# Patient Record
Sex: MALE | URBAN - METROPOLITAN AREA
[De-identification: names, ages, dates, MRNs, and addresses within clinical notes are randomized per-mention and may not be internally consistent; named-entity substitution may affect disease eponyms.]

---

## 2017-11-04 ENCOUNTER — EMERGENCY (EMERGENCY)
Facility: HOSPITAL | Age: 57
LOS: 1 days | Discharge: HOME | End: 2017-11-04
Attending: EMERGENCY MEDICINE | Admitting: EMERGENCY MEDICINE
Payer: COMMERCIAL

## 2017-11-04 VITALS
TEMPERATURE: 98 F | SYSTOLIC BLOOD PRESSURE: 146 MMHG | OXYGEN SATURATION: 97 % | RESPIRATION RATE: 18 BRPM | HEART RATE: 88 BPM | WEIGHT: 149.91 LBS | DIASTOLIC BLOOD PRESSURE: 94 MMHG
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
## 2017-11-04 PROCEDURE — 99283 EMERGENCY DEPT VISIT LOW MDM: CPT | Mod: 25

## 2017-11-04 PROCEDURE — 99283 EMERGENCY DEPT VISIT LOW MDM: CPT

## 2017-11-04 RX ORDER — EPINEPHRINE 0.3 MG/.3ML
0.3 INJECTION INTRAMUSCULAR; SUBCUTANEOUS
Qty: 1 | Refills: 0 | OUTPATIENT
Start: 2017-11-04

## 2017-11-04 RX ORDER — DIPHENHYDRAMINE HCL 50 MG
25 CAPSULE ORAL ONCE
Qty: 0 | Refills: 0 | Status: COMPLETED | OUTPATIENT
Start: 2017-11-04 | End: 2017-11-04

## 2017-11-04 RX ADMIN — Medication 25 MILLIGRAM(S): at 08:44

## 2017-11-04 RX ADMIN — Medication 60 MILLIGRAM(S): at 08:44

## 2017-11-04 NOTE — ED PROVIDER NOTE - OBJECTIVE STATEMENT
allergic reaction  58 yo, reports waking up with feeling of lump in throat, perioral pruritis and blotchy rash on his chest and palms of hands with the sensation of swelling in his hands, some improvement since 25 mg of Benedryl, Patient reports this is the 3rd time this has happened this month, pt reports he had a blood panel for allergy testing and came back as somewhat allergic to shellfish and eggs. Patient has not had typical skin testing and did not see an allergist as of yet. had [employer] which has egg in it yesterday and [employer] for dinner.

## 2017-11-04 NOTE — ED PROVIDER NOTE - ENMT, MLM
Airway patent, Nasal mucosa clear. Mouth with normal mucosa. Throat has no vesicles, no oropharyngeal exudates and uvula is midline. NO stridor , no Uvular edema , nl speech

## 2017-11-04 NOTE — ED PROVIDER NOTE - MEDICAL DECISION MAKING DETAILS
Patient presents with symptoms consistent with allergy, suspect food related, Will Steroids, Benedryl 25 ( took 25 already,  Will  observe for improvement of symptoms. If symptoms resolve appropriately and pt well after observation will discharge on Prednisone, Benedryl In addition will advise allergist follow up and give epinephrine pen Rx with instructions for use. Patient presents with symptoms consistent with allergy, suspect food related, Will Steroids, Benadryl 25 ( took 25 already,  Will  observe for improvement of symptoms. If symptoms resolve appropriately and pt well after observation will discharge on Prednisone, Benedryl In addition will advise allergist follow up and give epinephrine pen Rx with instructions for use.

## 2017-11-04 NOTE — ED ADULT NURSE NOTE - OBJECTIVE STATEMENT
Patient arrived to ED via walk-in stating, "I think I am allergic to something I ate, I feel itchy."  Patient is AAOx3, in NAD, speaking in full sentences, complaining of "strange feeling" in throat and itchiness.  Patient denies SOB, N/V/D or any other complaints at this time.  Patient took 25mg Benadryl PTA.  Will continue with plan of care.

## 2017-11-09 DIAGNOSIS — Z91.013 ALLERGY TO SEAFOOD: ICD-10-CM

## 2017-11-09 DIAGNOSIS — Z91.018 ALLERGY TO OTHER FOODS: ICD-10-CM

## 2017-11-09 DIAGNOSIS — R21 RASH AND OTHER NONSPECIFIC SKIN ERUPTION: ICD-10-CM

## 2017-11-09 DIAGNOSIS — L29.9 PRURITUS, UNSPECIFIED: ICD-10-CM

## 2017-11-09 DIAGNOSIS — T78.1XXA OTHER ADVERSE FOOD REACTIONS, NOT ELSEWHERE CLASSIFIED, INITIAL ENCOUNTER: ICD-10-CM

## 2017-11-09 DIAGNOSIS — Z91.012 ALLERGY TO EGGS: ICD-10-CM

## 2017-11-09 DIAGNOSIS — M79.89 OTHER SPECIFIED SOFT TISSUE DISORDERS: ICD-10-CM

## 2017-12-02 ENCOUNTER — EMERGENCY (EMERGENCY)
Facility: HOSPITAL | Age: 57
LOS: 1 days | Discharge: HOME | End: 2017-12-02
Attending: EMERGENCY MEDICINE | Admitting: EMERGENCY MEDICINE
Payer: COMMERCIAL

## 2017-12-02 VITALS
SYSTOLIC BLOOD PRESSURE: 105 MMHG | DIASTOLIC BLOOD PRESSURE: 57 MMHG | TEMPERATURE: 98 F | HEART RATE: 80 BPM | OXYGEN SATURATION: 97 % | RESPIRATION RATE: 18 BRPM

## 2017-12-02 DIAGNOSIS — L29.9 PRURITUS, UNSPECIFIED: ICD-10-CM

## 2017-12-02 DIAGNOSIS — Z91.013 ALLERGY TO SEAFOOD: ICD-10-CM
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
## 2017-12-02 DIAGNOSIS — T78.40XA ALLERGY, UNSPECIFIED, INITIAL ENCOUNTER: ICD-10-CM

## 2017-12-02 DIAGNOSIS — Z79.52 LONG TERM (CURRENT) USE OF SYSTEMIC STEROIDS: ICD-10-CM

## 2017-12-02 DIAGNOSIS — Z91.018 ALLERGY TO OTHER FOODS: ICD-10-CM

## 2017-12-02 DIAGNOSIS — K14.9 DISEASE OF TONGUE, UNSPECIFIED: ICD-10-CM

## 2017-12-02 DIAGNOSIS — Z91.012 ALLERGY TO EGGS: ICD-10-CM

## 2017-12-02 DIAGNOSIS — J45.909 UNSPECIFIED ASTHMA, UNCOMPLICATED: ICD-10-CM
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
## 2017-12-02 DIAGNOSIS — Z79.899 OTHER LONG TERM (CURRENT) DRUG THERAPY: ICD-10-CM

## 2017-12-02 PROCEDURE — 99282 EMERGENCY DEPT VISIT SF MDM: CPT

## 2017-12-02 PROCEDURE — 99283 EMERGENCY DEPT VISIT LOW MDM: CPT | Mod: 25

## 2017-12-02 NOTE — ED PROVIDER NOTE - MEDICAL DECISION MAKING DETAILS
56 yo male with h/o anxiety, in the Er after an episode of itching to his body. Pt took PO Benadryl and his symptoms resolved. no rash on exam, no airway involvement or difficulty breathing. returned precautions discussed. stable for discharge

## 2017-12-02 NOTE — ED PROVIDER NOTE - OBJECTIVE STATEMENT
58 yo male in the ER concerned that he had an allergic reaction this morning. 56 yo male in the ER concerned that he had an allergic reaction this morning. Woke up feeling itchy to his hands, back and anterior chest, then, felt that his tongue is swelling? itching to his scalp. pt took Benadryl 100 mg PO, and now itching stopped. Pt just came to ER because he feels very anxious. Has h/on similar symptoms in the past. No rash noticed, no difficulty breathing.

## 2017-12-02 NOTE — ED PROVIDER NOTE - ATTENDING CONTRIBUTION TO CARE
57M hx asthma, c/o allergic reaction.  +itching to arms, scalp, hands.  c/o tongue discomfort.  took benadryl last night.  no sob. no cough.  gen- nad  heent- ncat, clear conj, no stridor, no trismus, no intraoral swelling  cv -rrr  lungs -ctab  abd - soft, nt, nd  ext -wwp, no edema  neuro -aox3, steady gait, bennett  no rash  no airway compromise, no resp distress, speaking in full sentences, no drooling.  recommend PMD f/u

## 2017-12-02 NOTE — ED ADULT NURSE NOTE - OBJECTIVE STATEMENT
itchiness on his hands and his thigh,  lip swelling, started before 5am today, had 2 tabs of benadryl feeling much better now denies any difficulty of breathing but feeling of having discomfort when swallowing

## 2017-12-02 NOTE — ED PROVIDER NOTE - ENMT, MLM
Airway patent, Nasal mucosa clear. Mouth with normal mucosa. Throat has no vesicles, no oropharyngeal exudates and uvula is midline. no stridor, no drooling

## 2017-12-02 NOTE — ED ADULT TRIAGE NOTE - CHIEF COMPLAINT QUOTE
pt c/o itching that started around 5 am , pt c/o itching mostly in the palm of hands and upper extremities ,scalp , and things which travels  to buttocks , no rash noted , erythema noted , pt also c/o tongue discomfort / swelling noted , pt had dinner around 8 pm last night , pt took benadryl 100 mg total last dose around 515 am, same incident occurred about a week ago pt was given benadryl and steroids and discharged

## 2021-05-09 ENCOUNTER — EMERGENCY (EMERGENCY)
Facility: HOSPITAL | Age: 61
LOS: 1 days | Discharge: HOME | End: 2021-05-09
Admitting: EMERGENCY MEDICINE
Payer: COMMERCIAL

## 2021-05-09 ENCOUNTER — TRANSCRIPTION ENCOUNTER (OUTPATIENT)
Age: 61
End: 2021-05-09

## 2021-05-09 VITALS
SYSTOLIC BLOOD PRESSURE: 164 MMHG | TEMPERATURE: 98 F | OXYGEN SATURATION: 97 % | DIASTOLIC BLOOD PRESSURE: 94 MMHG | HEART RATE: 75 BPM | RESPIRATION RATE: 18 BRPM

## 2021-05-09 DIAGNOSIS — J45.909 UNSPECIFIED ASTHMA, UNCOMPLICATED: ICD-10-CM

## 2021-05-09 DIAGNOSIS — Z20.822 CONTACT WITH AND (SUSPECTED) EXPOSURE TO COVID-19: ICD-10-CM

## 2021-05-09 DIAGNOSIS — Z79.52 LONG TERM (CURRENT) USE OF SYSTEMIC STEROIDS: ICD-10-CM

## 2021-05-09 DIAGNOSIS — R03.0 ELEVATED BLOOD-PRESSURE READING, WITHOUT DIAGNOSIS OF HYPERTENSION: ICD-10-CM

## 2021-05-09 DIAGNOSIS — Z91.012 ALLERGY TO EGGS: ICD-10-CM

## 2021-05-09 DIAGNOSIS — Z91.013 ALLERGY TO SEAFOOD: ICD-10-CM

## 2021-05-09 DIAGNOSIS — Z91.018 ALLERGY TO OTHER FOODS: ICD-10-CM

## 2021-05-09 LAB — SARS-COV-2 RNA SPEC QL NAA+PROBE: SIGNIFICANT CHANGE UP

## 2021-05-09 PROCEDURE — U0005: CPT

## 2021-05-09 PROCEDURE — 99282 EMERGENCY DEPT VISIT SF MDM: CPT

## 2021-05-09 PROCEDURE — U0003: CPT

## 2021-05-09 PROCEDURE — 99283 EMERGENCY DEPT VISIT LOW MDM: CPT

## 2021-05-09 NOTE — ED PROVIDER NOTE - CARE PLAN
Principal Discharge DX:	Encounter for medical screening examination   Principal Discharge DX:	Encounter for medical screening examination  Secondary Diagnosis:	Elevated blood pressure reading

## 2021-05-09 NOTE — ED PROVIDER NOTE - NSFOLLOWUPINSTRUCTIONS_ED_ALL_ED_FT
Your blood pressure was elevated today in the ER. Uncontrolled high blood pressure may lead to risks such as heart disease, heart attacks, strokes and kidney disease. Please follow up with your PMD for further evaluation and return to the ED for any concerning symptoms.

## 2021-05-09 NOTE — ED PROVIDER NOTE - PATIENT PORTAL LINK FT
You can access the FollowMyHealth Patient Portal offered by [facility] by registering at the following website: [email or web address]. By joining [employer]’s FollowMyHealth portal, you will also be able to view your health information using other applications (apps) compatible with our system.

## 2021-05-09 NOTE — ED PROVIDER NOTE - CLINICAL SUMMARY MEDICAL DECISION MAKING FREE TEXT BOX
Patient is presenting to the Emergency Department requesting a COVID-19 test.  Patient has an unremarkable focused exam and looks well.  Nasopharyngeal PCR has been obtained and patient has been guided on how to obtain their results.  General COVID-19 discharge instructions have been given to the patient. Patient is presenting to the Emergency Department requesting a COVID-19 test.  Patient has an unremarkable focused exam and looks well.  Nasopharyngeal PCR has been obtained and patient has been guided on how to obtain their results.  General COVID-19 discharge instructions have been given to the patient.    Pt. noted to have an elevated blood pressure reading here in the ED today. No sxs to suggest hypertensive urgency or crisis. Discussed dangers of uncontrolled HTN. Pt given HTN discharge instructions discussing causes and risks. Strictly advised PMD follow-up and given return precautions.

## 2022-08-14 NOTE — ED ADULT NURSE NOTE - CHIEF COMPLAINT QUOTE
pt c/o itching that started around 5 am , pt c/o itching mostly in the palm of hands and upper extremities ,scalp , and things which travels  to buttocks , no rash noted , erythema noted , pt also c/o tongue discomfort / swelling noted , pt had dinner around 8 pm last night , pt took benadryl 100 mg total last dose around 515 am, same incident occurred about a week ago pt was given benadryl and steroids and discharged
No

## 2023-03-18 ENCOUNTER — NON-APPOINTMENT (OUTPATIENT)
Age: 63
End: 2023-03-18

## 2023-03-21 ENCOUNTER — NON-APPOINTMENT (OUTPATIENT)
Age: 63
End: 2023-03-21

## 2023-03-23 ENCOUNTER — EMERGENCY (EMERGENCY)
Facility: HOSPITAL | Age: 63
LOS: 1 days | Discharge: HOME | End: 2023-03-23
Attending: EMERGENCY MEDICINE | Admitting: EMERGENCY MEDICINE
Payer: COMMERCIAL

## 2023-03-23 VITALS
HEART RATE: 74 BPM | OXYGEN SATURATION: 97 % | HEIGHT: 60 IN | WEIGHT: 151.9 LBS | DIASTOLIC BLOOD PRESSURE: 79 MMHG | TEMPERATURE: 98 F | SYSTOLIC BLOOD PRESSURE: 147 MMHG | RESPIRATION RATE: 17 BRPM

## 2023-03-23 VITALS
TEMPERATURE: 98 F | RESPIRATION RATE: 18 BRPM | HEART RATE: 72 BPM | SYSTOLIC BLOOD PRESSURE: 124 MMHG | DIASTOLIC BLOOD PRESSURE: 78 MMHG | OXYGEN SATURATION: 99 %

## 2023-03-23 DIAGNOSIS — I10 ESSENTIAL (PRIMARY) HYPERTENSION: ICD-10-CM

## 2023-03-23 DIAGNOSIS — Z91.012 ALLERGY TO EGGS: ICD-10-CM

## 2023-03-23 DIAGNOSIS — J02.9 ACUTE PHARYNGITIS, UNSPECIFIED: ICD-10-CM

## 2023-03-23 DIAGNOSIS — R53.83 OTHER FATIGUE: ICD-10-CM

## 2023-03-23 DIAGNOSIS — Z91.018 ALLERGY TO OTHER FOODS: ICD-10-CM

## 2023-03-23 DIAGNOSIS — B02.9 ZOSTER WITHOUT COMPLICATIONS: ICD-10-CM

## 2023-03-23 DIAGNOSIS — Z91.013 ALLERGY TO SEAFOOD: ICD-10-CM

## 2023-03-23 DIAGNOSIS — H92.01 OTALGIA, RIGHT EAR: ICD-10-CM

## 2023-03-23 LAB
ALBUMIN SERPL ELPH-MCNC: 4.3 G/DL — SIGNIFICANT CHANGE UP (ref 3.3–5)
ALP SERPL-CCNC: 59 U/L — SIGNIFICANT CHANGE UP (ref 40–120)
ALT FLD-CCNC: 20 U/L — SIGNIFICANT CHANGE UP (ref 10–45)
ANION GAP SERPL CALC-SCNC: 7 MMOL/L — SIGNIFICANT CHANGE UP (ref 5–17)
AST SERPL-CCNC: 17 U/L — SIGNIFICANT CHANGE UP (ref 10–40)
BASOPHILS # BLD AUTO: 0.06 K/UL — SIGNIFICANT CHANGE UP (ref 0–0.2)
BASOPHILS NFR BLD AUTO: 0.7 % — SIGNIFICANT CHANGE UP (ref 0–2)
BILIRUB SERPL-MCNC: 0.4 MG/DL — SIGNIFICANT CHANGE UP (ref 0.2–1.2)
BUN SERPL-MCNC: 23 MG/DL — SIGNIFICANT CHANGE UP (ref 7–23)
CALCIUM SERPL-MCNC: 9.4 MG/DL — SIGNIFICANT CHANGE UP (ref 8.4–10.5)
CHLORIDE SERPL-SCNC: 106 MMOL/L — SIGNIFICANT CHANGE UP (ref 96–108)
CO2 SERPL-SCNC: 31 MMOL/L — SIGNIFICANT CHANGE UP (ref 22–31)
CREAT SERPL-MCNC: 1.1 MG/DL — SIGNIFICANT CHANGE UP (ref 0.5–1.3)
EGFR: 76 ML/MIN/1.73M2 — SIGNIFICANT CHANGE UP
EOSINOPHIL # BLD AUTO: 0.11 K/UL — SIGNIFICANT CHANGE UP (ref 0–0.5)
EOSINOPHIL NFR BLD AUTO: 1.3 % — SIGNIFICANT CHANGE UP (ref 0–6)
GLUCOSE SERPL-MCNC: 125 MG/DL — HIGH (ref 70–99)
HCT VFR BLD CALC: 42.3 % — SIGNIFICANT CHANGE UP (ref 39–50)
HGB BLD-MCNC: 14.5 G/DL — SIGNIFICANT CHANGE UP (ref 13–17)
HIV 1+2 AB+HIV1 P24 AG SERPL QL IA: SIGNIFICANT CHANGE UP
IMM GRANULOCYTES NFR BLD AUTO: 0.4 % — SIGNIFICANT CHANGE UP (ref 0–0.9)
LYMPHOCYTES # BLD AUTO: 1.73 K/UL — SIGNIFICANT CHANGE UP (ref 1–3.3)
LYMPHOCYTES # BLD AUTO: 20.9 % — SIGNIFICANT CHANGE UP (ref 13–44)
MCHC RBC-ENTMCNC: 31.7 PG — SIGNIFICANT CHANGE UP (ref 27–34)
MCHC RBC-ENTMCNC: 34.3 GM/DL — SIGNIFICANT CHANGE UP (ref 32–36)
MCV RBC AUTO: 92.6 FL — SIGNIFICANT CHANGE UP (ref 80–100)
MONOCYTES # BLD AUTO: 0.53 K/UL — SIGNIFICANT CHANGE UP (ref 0–0.9)
MONOCYTES NFR BLD AUTO: 6.4 % — SIGNIFICANT CHANGE UP (ref 2–14)
NEUTROPHILS # BLD AUTO: 5.8 K/UL — SIGNIFICANT CHANGE UP (ref 1.8–7.4)
NEUTROPHILS NFR BLD AUTO: 70.3 % — SIGNIFICANT CHANGE UP (ref 43–77)
NRBC # BLD: 0 /100 WBCS — SIGNIFICANT CHANGE UP (ref 0–0)
PLATELET # BLD AUTO: 338 K/UL — SIGNIFICANT CHANGE UP (ref 150–400)
POTASSIUM SERPL-MCNC: 3.2 MMOL/L — LOW (ref 3.5–5.3)
POTASSIUM SERPL-SCNC: 3.2 MMOL/L — LOW (ref 3.5–5.3)
PROT SERPL-MCNC: 7.6 G/DL — SIGNIFICANT CHANGE UP (ref 6–8.3)
RBC # BLD: 4.57 M/UL — SIGNIFICANT CHANGE UP (ref 4.2–5.8)
RBC # FLD: 12.6 % — SIGNIFICANT CHANGE UP (ref 10.3–14.5)
SODIUM SERPL-SCNC: 144 MMOL/L — SIGNIFICANT CHANGE UP (ref 135–145)
WBC # BLD: 8.26 K/UL — SIGNIFICANT CHANGE UP (ref 3.8–10.5)
WBC # FLD AUTO: 8.26 K/UL — SIGNIFICANT CHANGE UP (ref 3.8–10.5)

## 2023-03-23 PROCEDURE — 85025 COMPLETE CBC W/AUTO DIFF WBC: CPT

## 2023-03-23 PROCEDURE — 80053 COMPREHEN METABOLIC PANEL: CPT

## 2023-03-23 PROCEDURE — 36415 COLL VENOUS BLD VENIPUNCTURE: CPT

## 2023-03-23 PROCEDURE — 96374 THER/PROPH/DIAG INJ IV PUSH: CPT

## 2023-03-23 PROCEDURE — 99284 EMERGENCY DEPT VISIT MOD MDM: CPT | Mod: 25

## 2023-03-23 PROCEDURE — 87389 HIV-1 AG W/HIV-1&-2 AB AG IA: CPT

## 2023-03-23 PROCEDURE — 99285 EMERGENCY DEPT VISIT HI MDM: CPT

## 2023-03-23 RX ORDER — LIDOCAINE 4 G/100G
10 CREAM TOPICAL ONCE
Refills: 0 | Status: COMPLETED | OUTPATIENT
Start: 2023-03-23 | End: 2023-03-23

## 2023-03-23 RX ORDER — KETOROLAC TROMETHAMINE 30 MG/ML
15 SYRINGE (ML) INJECTION ONCE
Refills: 0 | Status: DISCONTINUED | OUTPATIENT
Start: 2023-03-23 | End: 2023-03-23

## 2023-03-23 RX ADMIN — Medication 15 MILLIGRAM(S): at 13:31

## 2023-03-23 RX ADMIN — Medication 15 MILLIGRAM(S): at 13:46

## 2023-03-23 NOTE — ED PROVIDER NOTE - CARE PROVIDERS DIRECT ADDRESSES
[email or web address]

## 2023-03-23 NOTE — ED PROVIDER NOTE - CARE PROVIDER_API CALL
Chele Sadler)  Otolaryngology  [address] [place] [address]  Phone: [phone]  Fax: [phone]  Follow Up Time:     Gorge Iniguez)  Neurology; Neuromuscular Medicine  [address] [place] [address]  Phone: [phone]  Fax: [phone]  Follow Up Time:

## 2023-03-23 NOTE — ED PROVIDER NOTE - ATTENDING APP SHARED VISIT CONTRIBUTION OF CARE
63 yo M with PMH of hypertension complaining of right-sided sore throat x1 week. Patient initially went to his PCP who did a rapid strep which was negative, gave him amoxicillin and 2 days later the culture came back negative for strep. Patient states sore throat got worse  and had a low grade fever of 100 so he went to urgent care last Sunday and was told that he had oral herpes and was given Valtrex.  The following day he started having coughing fits. Reports pain with swallowing but is able to swallow.  Denies drooling. No fever since sunday. Denies chills, chest pain, shortness of breath.  Patient sexually active with 1 partner, no history of STDs, no concern for HIV. Pt AAO, NAD.  no drooling, no hoarseness. Afebrile,  +R sided pharyngeal erythema with pus in posterior pharynx in vertical line, uvula midline. seen by ENT and scoped, lesions down to uvula, airway intact, c/w oral zoster. Advised continuing valtrex. Non-toxic appearing and stable for discharge. To follow up outpatient. Strict return precautions given.

## 2023-03-23 NOTE — ED PROVIDER NOTE - NS ED ATTENDING STATEMENT MOD
This was a shared visit with the STEFANY. I reviewed and verified the documentation and independently performed the documented:

## 2023-03-23 NOTE — ED PROVIDER NOTE - NSFOLLOWUPINSTRUCTIONS_ED_ALL_ED_FT
Continue your valtrex as prescribed.   Follow up with ENT and neuro       Shingles    WHAT YOU NEED TO KNOW:    Shingles is a viral infection that causes a painful rash. Shingles is caused by the varicella-zoster virus. This is the same virus that causes chickenpox. The virus stays in your body after you have chickenpox, without causing any symptoms. Shingles occurs when the virus becomes active again. The active virus travels along a nerve to your skin and causes a rash. The rash usually lasts 2 to 3 weeks. Most people have shingles one time, but it is possible to develop it again.  Shingles         DISCHARGE INSTRUCTIONS:    Call your local emergency number (911 in the [place]) if:   •You have trouble moving your arms, legs, or face.      •You become confused, or have trouble speaking.      •You have a seizure.      Return to the emergency department if:   •You have weakness in an arm or leg.      •You have dizziness, a severe headache, or hearing or vision loss.      •You have painful, red, warm skin around the blisters, or the blisters drain pus.      •Your neck is stiff or you have trouble moving it.      Call your doctor if:   •A painful rash appears near your eye.      •The rash spreads to more areas and your pain worsens.      •You feel weak or have a headache.      •You have a cough, chills, or a fever.      •You have abdominal pain or nausea, or you are vomiting.      •You have questions or concerns about your condition or care.      Medicines: You may need any of the following:  •Antiviral medicine fights the virus causing your shingles. Start this medicine within 3 days after you notice the first symptoms. This may help prevent nerve pain. A shingles outbreak can cause nerve pain called post-herpetic neuralgia (PHN). PHN can last a long time after you heal from shingles.      •Topical anesthetics are used to numb the skin and decrease pain. They can be a cream, gel, spray, or patch.      •Anticonvulsants and antidepressants decrease nerve pain and may help you sleep at night.      •Antihistamines may help decrease itching.      •Acetaminophen decreases pain and fever. It is available without a doctor's order. Ask how much to take and how often to take it. Follow directions. Read the labels of all other medicines you are using to see if they also contain acetaminophen, or ask your doctor or pharmacist. Acetaminophen can cause liver damage if not taken correctly.      •NSAIDs, such as ibuprofen, help decrease swelling, pain, and fever. This medicine is available with or without a doctor's order. NSAIDs can cause stomach bleeding or kidney problems in certain people. If you take blood thinner medicine, always ask your healthcare provider if NSAIDs are safe for you. Always read the medicine label and follow directions.      •A steroid and numbing medicine injection may decrease severe pain that does not get better with other medicines.      •Take your medicine as directed. Contact your healthcare provider if you think your medicine is not helping or if you have side effects. Tell your provider if you are allergic to any medicine. Keep a list of the medicines, vitamins, and herbs you take. Include the amounts, and when and why you take them. Bring the list or the pill bottles to follow-up visits. Carry your medicine list with you in case of an emergency.      Self-care:   •Apply a cool, wet compress or take a cool bath. This may help decrease itching and pain.      •Keep your rash clean and dry. Cover your rash with a bandage. Do not use bandages with adhesive. Clothes may irritate your skin.      Prevent the spread of the shingles virus: The virus can be passed to a person who has never had chickenpox. This usually happens if the other person comes in contact with your open sores. This person may get chickenpox, but not shingles. You are contagious until your blisters scab over. Stay away from people who have not had chickenpox or the chickenpox vaccine. Avoid pregnant women, newborns, and people with weak immune systems. They have a higher risk of infection.   •Wash your hands often. Wash your hands several times each day. Wash after you use the bathroom, change a child's diaper, and before you prepare or eat food. Use soap and water every time. Rub your soapy hands together, lacing your fingers. Wash the front and back of your hands, and in between your fingers. Use the fingers of one hand to scrub under the fingernails of the other hand. Wash for at least 20 seconds. Rinse with warm, running water for several seconds. Then dry your hands with a clean towel or paper towel. Use hand  that contains alcohol if soap and water are not available. Do not touch your eyes, nose, or mouth without washing your hands first.  Handwashing           •Cover a sneeze or cough. Use a tissue that covers your mouth and nose. Throw the tissue away in a trash can right away. Use the bend of your arm if a tissue is not available. Wash your hands well with soap and water or use a hand .             Prevent shingles or another shingles outbreak:   •A vaccine may be given to help prevent shingles. You can get the vaccine even if you already had shingles. The vaccine comes in 2 forms. A 2-dose vaccine is usually given to adults 50 years or older. A 1-dose vaccine may be given to adults 60 years or older.      •The vaccine can help prevent a future outbreak. If you do get shingles again, the vaccine can keep it from becoming severe. Ask your healthcare provider about other vaccines you may need.      Follow up with your doctor as directed: Write down your questions so you remember to ask them during your visits.    For more information:   •Centers for Disease Control and Prevention  [address] [place], [place]  Phone: [phone]  Phone: [phone]  Web Address: http://www.cdc.gov

## 2023-03-23 NOTE — ED ADULT NURSE NOTE - NSIMPLEMENTINTERV_GEN_ALL_ED
Implemented All Universal Safety Interventions:  [place] to call system. Call bell, personal items and telephone within reach. Instruct patient to call for assistance. Room bathroom lighting operational. Non-slip footwear when patient is off stretcher. Physically safe environment: no spills, clutter or unnecessary equipment. Stretcher in lowest position, wheels locked, appropriate side rails in place.

## 2023-03-23 NOTE — ED ADULT TRIAGE NOTE - CHIEF COMPLAINT QUOTE
Pt presents to ED c/o 1 week of sore throat, was originally treated for strep, but was not getting better with antibiotics. went back to urgent care, diagnosed with oral shingles. told to come to ED. Speaking in clear full sentences, no vocal changes, no drooling.

## 2023-03-23 NOTE — ED PROVIDER NOTE - PHYSICAL EXAMINATION
CONSTITUTIONAL: Well-appearing;  in no apparent distress.   HEAD: Normocephalic; atraumatic.   EYES: PERRL; EOM intact; conjunctiva and sclera clear  ENT: +R sided pharyngeal erythema with pus in posterior pharynx in vertical line, uvula midline  NECK: Supple; non-tender; no LAD   CARDIOVASCULAR: rrr,  RESPIRATORY: Breathing easily;

## 2023-03-23 NOTE — ED PROVIDER NOTE - OBJECTIVE STATEMENT
62-year-old male with past medical history of hypertension complaining of right-sided sore throat x1 week.  patient initially went to his PCP who did a rapid strep which was negative, gave him amoxicillin anyway and 2 days later the culture came back negative for strep.  Patient states sore throat got worse  and had a low grade fever of 100 so he went to urgent care last Sunday and was told that he had oral herpes and was given Valtrex.  The following day he started having coughing fits.  Reports pain with swallowing but is able to swallow.  Denies drooling. No fever since sunday.   Patient went back to urgent care yesterday and was told that he should go to the ER or see an ENT immediately because there was pus on the back of his throat. Denies chills, chest pain, shortness of breath.  Patient sexually active with 1 partner, no history of STDs, no concern for HIV.

## 2023-03-23 NOTE — CONSULT NOTE ADULT - ASSESSMENT
62M with PMH HTN presents with sore throat and right ear pain for the last week. There are lesions to the right oropharynx extending to the supraglottis as well as the right posterior EAC. No cranial neuropathies. Symptoms are consistent with a herpetic infection. Now on day 4 of valtrex with improvement in symptoms.   - no ENT surgical intervention indicated at this time  - keep right ear dry  - continue valtrex: 1000 mg q8h x7 days total  - please give the patient a referral to neurology for outpatient follow-up  - patient may follow-up next week with Dr. Sadler (ENT): [phone]  - discussed with attending physician

## 2023-03-23 NOTE — ED PROVIDER NOTE - PATIENT PORTAL LINK FT
You can access the FollowMyHealth Patient Portal offered by [facility] by registering at the following website: [email or web address]. By joining [employer]’s FollowMyHealth portal, you will also be able to view your health information using other applications (apps) compatible with our system.

## 2023-03-23 NOTE — ED PROVIDER NOTE - CLINICAL SUMMARY MEDICAL DECISION MAKING FREE TEXT BOX
62-year-old male with past medical history of hypertension complaining of right-sided sore throat x1 week.  strep neg. diagnosed with oral herpes on valtrex but symptoms worsening. no drooling, no hoarseness. Afebrile,  +R sided pharyngeal erythema with pus in posterior pharynx in vertical line, uvula midline 62-year-old male with past medical history of hypertension complaining of right-sided sore throat x1 week.  strep neg. diagnosed with oral herpes on valtrex but symptoms worsening. no drooling, no hoarseness. Afebrile,  +R sided pharyngeal erythema with pus in posterior pharynx in vertical line, uvula midline. seen by ENT and scoped, lesions down to uvula, airway intact, c/w oral zoster. Advised continuing valtrex

## 2023-03-31 ENCOUNTER — APPOINTMENT (OUTPATIENT)
Dept: OTOLARYNGOLOGY | Facility: CLINIC | Age: 63
End: 2023-03-31
Payer: COMMERCIAL

## 2023-03-31 VITALS
HEIGHT: 60 IN | BODY MASS INDEX: 29.84 KG/M2 | SYSTOLIC BLOOD PRESSURE: 123 MMHG | OXYGEN SATURATION: 98 % | HEART RATE: 75 BPM | WEIGHT: 152 LBS | TEMPERATURE: 98 F | DIASTOLIC BLOOD PRESSURE: 75 MMHG

## 2023-03-31 DIAGNOSIS — B00.2 HERPESVIRAL GINGIVOSTOMATITIS AND PHARYNGOTONSILLITIS: ICD-10-CM

## 2023-03-31 DIAGNOSIS — R05.9 COUGH, UNSPECIFIED: ICD-10-CM

## 2023-03-31 DIAGNOSIS — R13.14 DYSPHAGIA, PHARYNGOESOPHAGEAL PHASE: ICD-10-CM

## 2023-03-31 PROCEDURE — 99213 OFFICE O/P EST LOW 20 MIN: CPT | Mod: 25

## 2023-03-31 PROCEDURE — 31575 DIAGNOSTIC LARYNGOSCOPY: CPT

## 2023-03-31 NOTE — PROCEDURE
[None] : none [Flexible Endoscope] : examined with the flexible endoscope [Normal] : posterior cricoid area had healthy pink mucosa in the interarytenoid area and the esophageal inlet [Dysphagia] : dysphagia not clearly evaluated by indirect laryngoscopy [Serial Number: ___] : Serial Number: [unfilled] [de-identified] : done for cough, recent oropharyngeal herpes to assess whether there are still lesions\par appears nl

## 2023-03-31 NOTE — HISTORY OF PRESENT ILLNESS
[de-identified] : 61 y/o M is presenting with sore throat approximately 2 weeks ago. He went to see his PCP, and tested negative for strep but was treated with amoxicillin once a day for 3 days. He went to urgent care because it was not improving, and was found to have "oral shingles" and was started on 1g valtrex TID for 1 week  and finished 5 days ago. He went to another urgent care and was advised to go to the emergency room. He went to [facility] ED and underwent flexible fiberoptic laryngoscopy and was found to have oral lesions and 2 lesions in his right ear. He reports that oral lesions are improving. He has never had this before. He is also c/o cough for the past 10 days. He saw his PCP again and was started on a steroid, mucinex, and promethazine. He did not have a CXR. He is scheduled to see pulmonologist next week. he still has some difficulty swallowing but it is improved

## 2023-03-31 NOTE — PHYSICAL EXAM
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

## 2023-03-31 NOTE — ASSESSMENT
[FreeTextEntry1] : 1. recent oropharyngeal herpes per history\par -reassured physical exam unremarkable\par -s/p valtrex\par -explained this can recur\par -recommended oral barrier protection when active\par 2. cough\par -continue to followup with PCP for further recommendations\par -followup with pulmonologist- he is scheduled to see one next week \par RTC as needed\par reassured ear appears nl

## 2023-04-01 ENCOUNTER — EMERGENCY (EMERGENCY)
Facility: HOSPITAL | Age: 63
LOS: 1 days | Discharge: HOME | End: 2023-04-01
Attending: EMERGENCY MEDICINE | Admitting: EMERGENCY MEDICINE
Payer: COMMERCIAL

## 2023-04-01 VITALS
DIASTOLIC BLOOD PRESSURE: 75 MMHG | HEART RATE: 91 BPM | OXYGEN SATURATION: 98 % | RESPIRATION RATE: 18 BRPM | TEMPERATURE: 98 F | SYSTOLIC BLOOD PRESSURE: 144 MMHG

## 2023-04-01 VITALS
WEIGHT: 151.9 LBS | DIASTOLIC BLOOD PRESSURE: 73 MMHG | RESPIRATION RATE: 20 BRPM | OXYGEN SATURATION: 97 % | HEART RATE: 97 BPM | TEMPERATURE: 98 F | HEIGHT: 60 IN | SYSTOLIC BLOOD PRESSURE: 107 MMHG

## 2023-04-01 LAB
ALBUMIN SERPL ELPH-MCNC: 4.3 G/DL — SIGNIFICANT CHANGE UP (ref 3.3–5)
ALP SERPL-CCNC: 55 U/L — SIGNIFICANT CHANGE UP (ref 40–120)
ALT FLD-CCNC: 23 U/L — SIGNIFICANT CHANGE UP (ref 10–45)
ANION GAP SERPL CALC-SCNC: 15 MMOL/L — SIGNIFICANT CHANGE UP (ref 5–17)
APPEARANCE UR: CLEAR — SIGNIFICANT CHANGE UP
APTT BLD: 27.4 SEC — LOW (ref 27.5–35.5)
AST SERPL-CCNC: 18 U/L — SIGNIFICANT CHANGE UP (ref 10–40)
BACTERIA # UR AUTO: PRESENT /HPF
BASOPHILS # BLD AUTO: 0.03 K/UL — SIGNIFICANT CHANGE UP (ref 0–0.2)
BASOPHILS NFR BLD AUTO: 0.2 % — SIGNIFICANT CHANGE UP (ref 0–2)
BILIRUB SERPL-MCNC: 0.6 MG/DL — SIGNIFICANT CHANGE UP (ref 0.2–1.2)
BILIRUB UR-MCNC: NEGATIVE — SIGNIFICANT CHANGE UP
BUN SERPL-MCNC: 24 MG/DL — HIGH (ref 7–23)
CALCIUM SERPL-MCNC: 8.9 MG/DL — SIGNIFICANT CHANGE UP (ref 8.4–10.5)
CHLORIDE SERPL-SCNC: 103 MMOL/L — SIGNIFICANT CHANGE UP (ref 96–108)
CK MB CFR SERPL CALC: 2.6 NG/ML — SIGNIFICANT CHANGE UP (ref 0–6.7)
CK SERPL-CCNC: 232 U/L — HIGH (ref 30–200)
CO2 SERPL-SCNC: 25 MMOL/L — SIGNIFICANT CHANGE UP (ref 22–31)
COLOR SPEC: YELLOW — SIGNIFICANT CHANGE UP
COMMENT - URINE: SIGNIFICANT CHANGE UP
CREAT SERPL-MCNC: 1.03 MG/DL — SIGNIFICANT CHANGE UP (ref 0.5–1.3)
D DIMER BLD IA.RAPID-MCNC: <150 NG/ML DDU — SIGNIFICANT CHANGE UP
DIFF PNL FLD: NEGATIVE — SIGNIFICANT CHANGE UP
EGFR: 83 ML/MIN/1.73M2 — SIGNIFICANT CHANGE UP
EOSINOPHIL # BLD AUTO: 0.01 K/UL — SIGNIFICANT CHANGE UP (ref 0–0.5)
EOSINOPHIL NFR BLD AUTO: 0.1 % — SIGNIFICANT CHANGE UP (ref 0–6)
EPI CELLS # UR: SIGNIFICANT CHANGE UP /HPF (ref 0–5)
GLUCOSE SERPL-MCNC: 132 MG/DL — HIGH (ref 70–99)
GLUCOSE UR QL: NEGATIVE — SIGNIFICANT CHANGE UP
HCT VFR BLD CALC: 45.3 % — SIGNIFICANT CHANGE UP (ref 39–50)
HGB BLD-MCNC: 15.5 G/DL — SIGNIFICANT CHANGE UP (ref 13–17)
IMM GRANULOCYTES NFR BLD AUTO: 0.6 % — SIGNIFICANT CHANGE UP (ref 0–0.9)
INR BLD: 1 — SIGNIFICANT CHANGE UP (ref 0.88–1.16)
KETONES UR-MCNC: ABNORMAL MG/DL
LEUKOCYTE ESTERASE UR-ACNC: ABNORMAL
LYMPHOCYTES # BLD AUTO: 1.12 K/UL — SIGNIFICANT CHANGE UP (ref 1–3.3)
LYMPHOCYTES # BLD AUTO: 7.5 % — LOW (ref 13–44)
MCHC RBC-ENTMCNC: 32.1 PG — SIGNIFICANT CHANGE UP (ref 27–34)
MCHC RBC-ENTMCNC: 34.2 GM/DL — SIGNIFICANT CHANGE UP (ref 32–36)
MCV RBC AUTO: 93.8 FL — SIGNIFICANT CHANGE UP (ref 80–100)
MONOCYTES # BLD AUTO: 0.29 K/UL — SIGNIFICANT CHANGE UP (ref 0–0.9)
MONOCYTES NFR BLD AUTO: 1.9 % — LOW (ref 2–14)
NEUTROPHILS # BLD AUTO: 13.44 K/UL — HIGH (ref 1.8–7.4)
NEUTROPHILS NFR BLD AUTO: 89.7 % — HIGH (ref 43–77)
NITRITE UR-MCNC: NEGATIVE — SIGNIFICANT CHANGE UP
NRBC # BLD: 0 /100 WBCS — SIGNIFICANT CHANGE UP (ref 0–0)
NT-PROBNP SERPL-SCNC: 46 PG/ML — SIGNIFICANT CHANGE UP (ref 0–300)
PH UR: 5.5 — SIGNIFICANT CHANGE UP (ref 5–8)
PLATELET # BLD AUTO: 449 K/UL — HIGH (ref 150–400)
POTASSIUM SERPL-MCNC: 3.6 MMOL/L — SIGNIFICANT CHANGE UP (ref 3.5–5.3)
POTASSIUM SERPL-SCNC: 3.6 MMOL/L — SIGNIFICANT CHANGE UP (ref 3.5–5.3)
PROT SERPL-MCNC: 6.6 G/DL — SIGNIFICANT CHANGE UP (ref 6–8.3)
PROT UR-MCNC: ABNORMAL MG/DL
PROTHROM AB SERPL-ACNC: 11.9 SEC — SIGNIFICANT CHANGE UP (ref 10.5–13.4)
RBC # BLD: 4.83 M/UL — SIGNIFICANT CHANGE UP (ref 4.2–5.8)
RBC # FLD: 13.3 % — SIGNIFICANT CHANGE UP (ref 10.3–14.5)
RBC CASTS # UR COMP ASSIST: < 5 /HPF — SIGNIFICANT CHANGE UP
SARS-COV-2 RNA SPEC QL NAA+PROBE: SIGNIFICANT CHANGE UP
SODIUM SERPL-SCNC: 143 MMOL/L — SIGNIFICANT CHANGE UP (ref 135–145)
SP GR SPEC: >=1.03 — SIGNIFICANT CHANGE UP (ref 1–1.03)
TROPONIN T SERPL-MCNC: 0.01 NG/ML — SIGNIFICANT CHANGE UP (ref 0–0.01)
UROBILINOGEN FLD QL: 0.2 E.U./DL — SIGNIFICANT CHANGE UP
WBC # BLD: 14.98 K/UL — HIGH (ref 3.8–10.5)
WBC # FLD AUTO: 14.98 K/UL — HIGH (ref 3.8–10.5)
WBC UR QL: ABNORMAL /HPF

## 2023-04-01 PROCEDURE — 81001 URINALYSIS AUTO W/SCOPE: CPT

## 2023-04-01 PROCEDURE — 71046 X-RAY EXAM CHEST 2 VIEWS: CPT | Mod: 26

## 2023-04-01 PROCEDURE — 82553 CREATINE MB FRACTION: CPT

## 2023-04-01 PROCEDURE — 80053 COMPREHEN METABOLIC PANEL: CPT

## 2023-04-01 PROCEDURE — 82550 ASSAY OF CK (CPK): CPT

## 2023-04-01 PROCEDURE — 85379 FIBRIN DEGRADATION QUANT: CPT

## 2023-04-01 PROCEDURE — 87086 URINE CULTURE/COLONY COUNT: CPT

## 2023-04-01 PROCEDURE — 94640 AIRWAY INHALATION TREATMENT: CPT

## 2023-04-01 PROCEDURE — 85025 COMPLETE CBC W/AUTO DIFF WBC: CPT

## 2023-04-01 PROCEDURE — 85730 THROMBOPLASTIN TIME PARTIAL: CPT

## 2023-04-01 PROCEDURE — 87635 SARS-COV-2 COVID-19 AMP PRB: CPT

## 2023-04-01 PROCEDURE — 83880 ASSAY OF NATRIURETIC PEPTIDE: CPT

## 2023-04-01 PROCEDURE — 84484 ASSAY OF TROPONIN QUANT: CPT

## 2023-04-01 PROCEDURE — 36415 COLL VENOUS BLD VENIPUNCTURE: CPT

## 2023-04-01 PROCEDURE — 71046 X-RAY EXAM CHEST 2 VIEWS: CPT

## 2023-04-01 PROCEDURE — 85610 PROTHROMBIN TIME: CPT

## 2023-04-01 PROCEDURE — 93005 ELECTROCARDIOGRAM TRACING: CPT

## 2023-04-01 PROCEDURE — 99285 EMERGENCY DEPT VISIT HI MDM: CPT | Mod: 25

## 2023-04-01 PROCEDURE — 99285 EMERGENCY DEPT VISIT HI MDM: CPT

## 2023-04-01 RX ORDER — IPRATROPIUM/ALBUTEROL SULFATE 18-103MCG
3 AEROSOL WITH ADAPTER (GRAM) INHALATION ONCE
Refills: 0 | Status: COMPLETED | OUTPATIENT
Start: 2023-04-01 | End: 2023-04-01

## 2023-04-01 RX ORDER — GUAIFENESIN/DEXTROMETHORPHAN 600MG-30MG
20 TABLET, EXTENDED RELEASE 12 HR ORAL ONCE
Refills: 0 | Status: DISCONTINUED | OUTPATIENT
Start: 2023-04-01 | End: 2023-04-05

## 2023-04-01 RX ORDER — BENZOCAINE AND MENTHOL 5; 1 G/100ML; G/100ML
2 LIQUID ORAL ONCE
Refills: 0 | Status: DISCONTINUED | OUTPATIENT
Start: 2023-04-01 | End: 2023-04-05

## 2023-04-01 RX ORDER — ALBUTEROL 90 UG/1
2 AEROSOL, METERED ORAL
Qty: 1 | Refills: 0
Start: 2023-04-01

## 2023-04-01 RX ORDER — LIDOCAINE HCL 20 MG/ML
8 VIAL (ML) INJECTION ONCE
Refills: 0 | Status: COMPLETED | OUTPATIENT
Start: 2023-04-01 | End: 2023-04-01

## 2023-04-01 RX ADMIN — Medication 125 MILLIGRAM(S): at 17:06

## 2023-04-01 RX ADMIN — Medication 8 MILLILITER(S): at 17:46

## 2023-04-01 RX ADMIN — Medication 3 MILLILITER(S): at 17:14

## 2023-04-01 RX ADMIN — Medication 3 MILLILITER(S): at 17:11

## 2023-04-01 RX ADMIN — Medication 3 MILLILITER(S): at 17:06

## 2023-04-01 NOTE — ED ADULT NURSE NOTE - OBJECTIVE STATEMENT
62y M, PMHx of asthma, presents to the ED c/o non productive cough and SOB x1 week. Pt states he was given a course of prednisone and promethazine earlier this week by PCP with little to no relief. Denies fever, chills. Pt A&Ox4, ambulatory with steady gait, speaking in clear/full sentences.

## 2023-04-01 NOTE — ED PROVIDER NOTE - PATIENT PORTAL LINK FT
You can access the FollowMyHealth Patient Portal offered by [facility] by registering at the following website: [email or web address]. By joining [employer]’s FollowMyHealth portal, you will also be able to view your health information using other applications (apps) compatible with our system.

## 2023-04-01 NOTE — ED PROVIDER NOTE - PHYSICAL EXAMINATION
VITAL SIGNS: I have reviewed nursing notes and confirm.  CONSTITUTIONAL: Well-developed; well-nourished; in no acute distress.  SKIN: Agree with RN documentation regarding decubitus evaluation. Remainder of skin exam is warm and dry, no acute rash.  HEAD: Normocephalic; atraumatic.  EYES: PERRL, EOM intact; conjunctiva and sclera clear.  ENT: No nasal discharge; airway clear.  NECK: Supple; non tender.  CARD: S1, S2 normal; no murmurs, gallops, or rubs. Regular rate and rhythm.  RESP: Diffuse wheezing, no rales or rhonchi. Pt speaking full sentence. In no respiratory distress but with occasional coughing fits.   ABD: Normal bowel sounds; soft; non-distended; non-tender; no hepatosplenomegaly.  EXT: Normal ROM. No clubbing, cyanosis or edema.  LYMPH: No acute cervical adenopathy.  NEURO: Alert, oriented. Grossly unremarkable.  PSYCH: Cooperative, appropriate.

## 2023-04-01 NOTE — ED PROVIDER NOTE - OBJECTIVE STATEMENT
61 y/o M with a PMHx of reported childhood asthma and HTN reports having asthma exacerbations when having an URI and sometimes possibly environmental infrequent exacerbations. Pt reports typically having a cough when his asthma acts and is now with a cough since a week ago which he reports feeling an intense irritation when exhaling and is having very uncomfortable coughing fits. Pt was given a course of prednisone (20mg daily) and promethazine earlier this week by PCP with little to no relief coughing symptoms. Pt has no fevers and his cough is non productive.

## 2023-04-01 NOTE — ED PROVIDER NOTE - NSFOLLOWUPINSTRUCTIONS_ED_ALL_ED_FT
Skin normal color for race, warm, dry and intact. No evidence of rash.
Asthma    Asthma is a condition in which the airways tighten and narrow, making it difficult to breath. Asthma episodes, also called asthma attacks, range from minor to life-threatening. Symptoms include wheezing, coughing, chest tightness, or shortness of breath. The diagnosis of asthma is made by a review of your medical history and a physical exam, but may involve additional testing. Asthma cannot be cured, but medicines and lifestyle changes can help control it. Avoid triggers of asthma which may include animal dander, pollen, mold, smoke, air pollutants, etc.     SEEK IMMEDIATE MEDICAL CARE IF YOU HAVE ANY OF THE FOLLOWING SYMPTOMS: worsening of symptoms, shortness of breath at rest, chest pain, bluish discoloration to lips or fingertips, lightheadedness/dizziness, or fever.

## 2023-04-01 NOTE — ED PROVIDER NOTE - CLINICAL SUMMARY MEDICAL DECISION MAKING FREE TEXT BOX
63 y/o M with persistent cough for 1w and wheezing on exam. Pt is non-toxic with normal oxygen. Exam consistent with asthma exacerbations. Most likely URI, will give additional steroids, NEBs, and inhaled lidocaine. Will obtain labs including cardiac enzyme and ddimer for PE evaluation although low suspicion as no PE risk factors. Expect likely to discharge with albuterol and additional steroids. Dispo pending work up and reevaluation. 63 y/o M with persistent cough for 1w and wheezing on exam. Pt is non-toxic with normal oxygen. Exam consistent with asthma exacerbations. Most likely URI, will give additional steroids, Nebs, and inhaled lidocaine. Will obtain labs including cardiac enzyme and d - dimer for PE evaluation although low suspicion as no PE risk factors. Expect likely to discharge with albuterol and additional steroids. Dispo pending work up and reevaluation.

## 2023-04-03 DIAGNOSIS — J45.901 UNSPECIFIED ASTHMA WITH (ACUTE) EXACERBATION: ICD-10-CM

## 2023-04-03 DIAGNOSIS — I10 ESSENTIAL (PRIMARY) HYPERTENSION: ICD-10-CM

## 2023-04-03 DIAGNOSIS — R05.1 ACUTE COUGH: ICD-10-CM

## 2023-04-03 DIAGNOSIS — Z20.822 CONTACT WITH AND (SUSPECTED) EXPOSURE TO COVID-19: ICD-10-CM

## 2023-04-03 LAB
CULTURE RESULTS: NO GROWTH — SIGNIFICANT CHANGE UP
SPECIMEN SOURCE: SIGNIFICANT CHANGE UP

## 2023-04-04 ENCOUNTER — APPOINTMENT (OUTPATIENT)
Dept: PULMONOLOGY | Facility: CLINIC | Age: 63
End: 2023-04-04

## 2023-04-06 ENCOUNTER — APPOINTMENT (OUTPATIENT)
Dept: PULMONOLOGY | Facility: CLINIC | Age: 63
End: 2023-04-06
Payer: COMMERCIAL

## 2023-04-06 VITALS
SYSTOLIC BLOOD PRESSURE: 130 MMHG | HEART RATE: 101 BPM | OXYGEN SATURATION: 95 % | HEIGHT: 60 IN | WEIGHT: 152 LBS | RESPIRATION RATE: 12 BRPM | DIASTOLIC BLOOD PRESSURE: 71 MMHG | BODY MASS INDEX: 29.84 KG/M2 | TEMPERATURE: 98.1 F

## 2023-04-06 DIAGNOSIS — Z82.49 FAMILY HISTORY OF ISCHEMIC HEART DISEASE AND OTHER DISEASES OF THE CIRCULATORY SYSTEM: ICD-10-CM

## 2023-04-06 DIAGNOSIS — Z80.49 FAMILY HISTORY OF MALIGNANT NEOPLASM OF OTHER GENITAL ORGANS: ICD-10-CM

## 2023-04-06 PROBLEM — I10 ESSENTIAL (PRIMARY) HYPERTENSION: Chronic | Status: ACTIVE | Noted: 2023-04-01

## 2023-04-06 PROCEDURE — 99203 OFFICE O/P NEW LOW 30 MIN: CPT

## 2023-04-10 RX ORDER — ALBUTEROL SULFATE 2.5 MG/3ML
(2.5 MG/3ML) SOLUTION RESPIRATORY (INHALATION)
Qty: 1 | Refills: 1 | Status: ACTIVE | COMMUNITY
Start: 2023-04-10 | End: 1900-01-01

## 2023-04-10 NOTE — REVIEW OF SYSTEMS
[Fever] : fever [Nasal Congestion] : nasal congestion [Postnasal Drip] : postnasal drip [Cough] : cough [Chest Tightness] : chest tightness [Dyspnea] : dyspnea [Seasonal Allergies] : seasonal allergies [Rash] : rash [Headache] : headache [Negative] : Hematologic [Recent Wt Gain (___ Lbs)] : ~T no recent weight gain [Recent Wt Loss (___ Lbs)] : ~T no recent weight loss [Sore Throat] : no sore throat [Sputum] : no sputum [Pleuritic Pain] : no pleuritic pain [Wheezing] : no wheezing [SOB on Exertion] : no sob on exertion [Orthopnea] : no orthopnea [GERD] : no gerd [TextBox_3] : 2 week ago, not anymore (99F) [TextBox_30] : cough through out interview, cough triggered at the end of expiration and with deep inspiration [TextBox_101] : eczema like [TextBox_119] : tension: chronic [TextBox_134] : stressful

## 2023-04-10 NOTE — HISTORY OF PRESENT ILLNESS
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 He reports cough starting approximately 2 weeks ago, right after completing antiviral Rx. \par Cough is predominantly present during the day, not during the night, patient can sleep during night without interruption. He denies sputum, reports feeling of chest tightness. He went to the ED with symptoms and was told he has asthma. He was given prednisone taper to complete  over the week. He reports he felt better, but cough is still persistent. He was not given any nasal sprays, he did not try any OTC medications.\par Patient reports frequent episodes of bronchitis, some years 4-5 a year. These were treated with abx.\par He was prescribed breo 7 years ago was told to use it when needed. he has been using it on and off, stopped 6 months ago, but cannot recall reason why.\par He was given albuterol years ago, but almost never used it, he started recently 2 days ago, he felt it helped with current cough\par \par He works in office environment.\par Denies any mold exposure, no history of exotic travel.\par Never smoker.\par No family history of asthma\par

## 2023-04-10 NOTE — ASSESSMENT
[FreeTextEntry1] : Pt is a 63 y/o male with no significant past medical history presenting for initial evaluation of his cough.\par \par Based on patient exam and history this is likely post viral acute cough.\par Will start flonase 1-2 sprays to each nostril BID. He can continue breo PRN, will use albuterol nebs BID standing and robitussin PRN .\par OV in 4-6 week.

## 2023-04-10 NOTE — PHYSICAL EXAM
[No Acute Distress] : no acute distress [Well Groomed] : well groomed [II] : Mallampati Class: II [Normal Appearance] : normal appearance [Supple] : supple [No Neck Mass] : no neck mass [Normal Rate/Rhythm] : normal rate/rhythm [Normal S1, S2] : normal s1, s2 [No Resp Distress] : no resp distress [Clear to Auscultation Bilaterally] : clear to auscultation bilaterally [Benign] : benign [Normal Gait] : normal gait [No Clubbing] : no clubbing [No Cyanosis] : no cyanosis [No Edema] : no edema [Normal Color/ Pigmentation] : normal color/ pigmentation [No Focal Deficits] : no focal deficits [Oriented x3] : oriented x3 [TextBox_11] : wet posterior oropharynx, no exudate [TextBox_68] : no wheezing

## 2023-04-25 RX ORDER — FLUTICASONE PROPIONATE 50 UG/1
50 SPRAY, METERED NASAL TWICE DAILY
Qty: 1 | Refills: 3 | Status: ACTIVE | COMMUNITY
Start: 2023-04-06 | End: 1900-01-01

## 2023-05-11 ENCOUNTER — APPOINTMENT (OUTPATIENT)
Dept: PULMONOLOGY | Facility: CLINIC | Age: 63
End: 2023-05-11
Payer: COMMERCIAL

## 2023-05-11 VITALS
WEIGHT: 152 LBS | DIASTOLIC BLOOD PRESSURE: 81 MMHG | TEMPERATURE: 97.6 F | BODY MASS INDEX: 29.84 KG/M2 | HEART RATE: 100 BPM | SYSTOLIC BLOOD PRESSURE: 118 MMHG | HEIGHT: 60 IN | OXYGEN SATURATION: 96 %

## 2023-05-11 PROCEDURE — 99213 OFFICE O/P EST LOW 20 MIN: CPT

## 2023-05-12 NOTE — HISTORY OF PRESENT ILLNESS
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 He reports cough starting approximately 2 weeks ago, right after completing antiviral Rx. \par Cough is predominantly present during the day, not during the night, patient can sleep during night without interruption. He denies sputum, reports feeling of chest tightness. He went to the ED with symptoms and was told he has asthma. He was given prednisone taper to complete over the week. He reports he felt better, but cough is still persistent. He was not given any nasal sprays, he did not try any OTC medications.\par Patient reports frequent episodes of bronchitis, some years 4-5 a year. These were treated with abx.\par He was prescribed breo 7 years ago was told to use it when needed. he has been using it on and off, stopped 6 months ago, but cannot recall reason why.\par He was given albuterol years ago, but almost never used it, he started recently 2 days ago, he felt it helped with current cough\par \par He works in office environment.\par Denies any mold exposure, no history of exotic travel.\par Never smoker.\par No family history of asthma\par \par 5/11/23\par Overall cough and PND is improved using two nasal sprays, BID albuterol nebs. Cough is mostly dry except in the AM. Only coughing now occasionally, rather than continuously for 20 min. Intermittent, but rare use of Breo ( 3 time total)  and has not used albuterol in several weeks. Reports dryness/irritation in the back of his throat and associated throat clearing.  No wheeze, shortness of breath, leg swelling, congestion, fever, chills.

## 2023-05-12 NOTE — PHYSICAL EXAM
[No Acute Distress] : no acute distress [Normal Oropharynx] : normal oropharynx [Erythema] : erythema [Nasal congestion] : nasal congestion [III] : Mallampati Class: III [Neck Circumference: ___] : neck circumference: [unfilled] [Normal Rate/Rhythm] : normal rate/rhythm [Normal S1, S2] : normal s1, s2 [No Resp Distress] : no resp distress [Clear to Auscultation Bilaterally] : clear to auscultation bilaterally [No Clubbing] : no clubbing [No Cyanosis] : no cyanosis [No Edema] : no edema [Oriented x3] : oriented x3 [Well Groomed] : well groomed [Supple] : supple [Soft] : soft [Normal Gait] : normal gait [No Focal Deficits] : no focal deficits [TextBox_44] : no cervical lad [TextBox_68] : l

## 2023-05-12 NOTE — PHYSICAL EXAM
[No Acute Distress] : no acute distress [Normal Oropharynx] : normal oropharynx [Erythema] : erythema [Nasal congestion] : nasal congestion [III] : Mallampati Class: III [Neck Circumference: ___] : neck circumference: [unfilled] [Normal Rate/Rhythm] : normal rate/rhythm [Normal S1, S2] : normal s1, s2 [No Resp Distress] : no resp distress [Clear to Auscultation Bilaterally] : clear to auscultation bilaterally [No Clubbing] : no clubbing [No Cyanosis] : no cyanosis [No Edema] : no edema [Oriented x3] : oriented x3 [Well Groomed] : well groomed [Supple] : supple [Soft] : soft [Normal Gait] : normal gait [No Focal Deficits] : no focal deficits [TextBox_68] : l [TextBox_44] : no cervical lad

## 2023-05-12 NOTE — END OF VISIT
[] : Fellow [Time Spent: ___ minutes] : I have spent [unfilled] minutes of time on the encounter. [FreeTextEntry3] : I, personally performed the evaluation and management (E/M) services for this established patient who presents today with (a) new problem(s)/exacerbation of (an) existing condition(s).  That E/M includes conducting the examination, assessing all new/exacerbated conditions, and establishing a new plan of care.  Today, my ACP/fellow was here to observe my evaluation and management services for this new problem/exacerbated condition to be followed going forward.\par \par

## 2023-05-12 NOTE — ASSESSMENT
[FreeTextEntry1] : Pt is a 63 y/o male with no significant past medical history  with likely UACS.\par \par Based on patient exam and history this is likely post viral acute cough and improving overall with current tx of bid flonase and bid abluterol nebs. Discussed continuing flonase BID and can taper albuterol nebs to once daily for 1 week then prn. Can also use breo prn. Once off albuterol can do PFTs in 3-4 weeks for sx of some shortness of breath and reports difficulty taking full breaths. \par OV after PFT or earlier if needed.\par

## 2023-05-12 NOTE — REVIEW OF SYSTEMS
[Sore Throat] : sore throat [Postnasal Drip] : postnasal drip [Dry Mouth] : dry mouth [Cough] : cough [Sputum] : sputum [Negative] : Psychiatric [Fever] : no fever [Chills] : no chills [Nasal Congestion] : no nasal congestion [Sinus Problems] : no sinus problems [Chest Tightness] : no chest tightness [Pleuritic Pain] : no pleuritic pain [Wheezing] : no wheezing [Chest Discomfort] : no chest discomfort [Edema] : no edema [Seasonal Allergies] : no seasonal allergies [GERD] : no gerd [Rash] : no rash

## 2023-05-31 ENCOUNTER — NON-APPOINTMENT (OUTPATIENT)
Age: 63
End: 2023-05-31

## 2023-05-31 ENCOUNTER — APPOINTMENT (OUTPATIENT)
Dept: NEUROLOGY | Facility: CLINIC | Age: 63
End: 2023-05-31
Payer: COMMERCIAL

## 2023-05-31 VITALS
DIASTOLIC BLOOD PRESSURE: 87 MMHG | BODY MASS INDEX: 29.84 KG/M2 | HEART RATE: 99 BPM | SYSTOLIC BLOOD PRESSURE: 129 MMHG | OXYGEN SATURATION: 95 % | WEIGHT: 152 LBS | TEMPERATURE: 98.2 F | HEIGHT: 60 IN

## 2023-05-31 PROCEDURE — 99204 OFFICE O/P NEW MOD 45 MIN: CPT

## 2023-07-17 ENCOUNTER — OUTPATIENT (OUTPATIENT)
Dept: OUTPATIENT SERVICES | Facility: HOSPITAL | Age: 63
LOS: 1 days | End: 2023-07-17
Payer: COMMERCIAL

## 2023-07-17 ENCOUNTER — APPOINTMENT (OUTPATIENT)
Dept: PULMONOLOGY | Facility: CLINIC | Age: 63
End: 2023-07-17

## 2023-07-17 DIAGNOSIS — R05.9 COUGH, UNSPECIFIED: ICD-10-CM

## 2023-07-17 PROCEDURE — 94760 N-INVAS EAR/PLS OXIMETRY 1: CPT

## 2023-07-17 PROCEDURE — 94729 DIFFUSING CAPACITY: CPT | Mod: 26

## 2023-07-17 PROCEDURE — 94060 EVALUATION OF WHEEZING: CPT

## 2023-07-17 PROCEDURE — 94726 PLETHYSMOGRAPHY LUNG VOLUMES: CPT

## 2023-07-17 PROCEDURE — 94010 BREATHING CAPACITY TEST: CPT | Mod: 26

## 2023-07-17 PROCEDURE — 94729 DIFFUSING CAPACITY: CPT

## 2023-07-17 PROCEDURE — 94727 GAS DIL/WSHOT DETER LNG VOL: CPT | Mod: 26

## 2023-07-20 NOTE — ED ADULT NURSE NOTE - OBJECTIVE STATEMENT
Patient presents to the ED complaining of throat pain for 8 days. Patient states that his strep test was negative and he was diagnosed with oral herpes. Denies any fever or chills.
Admission

## 2023-07-28 NOTE — ED ADULT NURSE NOTE - IS THE PATIENT ABLE TO BE SCREENED?
BP remains elevated.     I'd like him to start Lisinopril 20mg daily. Follow up with me in 1 month, with a BMP prior to visit.     Script pended.    Yes

## 2023-12-26 NOTE — ED PROVIDER NOTE - CARE PLAN
Principal Discharge DX:	Abdominal pain  Secondary Diagnosis:	Gastritis Principal Discharge DX:	Allergic reaction [employer] Interventions

## 2024-10-17 ENCOUNTER — APPOINTMENT (OUTPATIENT)
Dept: PULMONOLOGY | Facility: CLINIC | Age: 64
End: 2024-10-17
Payer: COMMERCIAL

## 2024-10-17 ENCOUNTER — NON-APPOINTMENT (OUTPATIENT)
Age: 64
End: 2024-10-17

## 2024-10-17 VITALS
TEMPERATURE: 97 F | DIASTOLIC BLOOD PRESSURE: 65 MMHG | HEIGHT: 60 IN | RESPIRATION RATE: 12 BRPM | SYSTOLIC BLOOD PRESSURE: 101 MMHG | BODY MASS INDEX: 30.04 KG/M2 | OXYGEN SATURATION: 96 % | HEART RATE: 97 BPM | WEIGHT: 153 LBS

## 2024-10-17 PROCEDURE — 99213 OFFICE O/P EST LOW 20 MIN: CPT

## 2024-10-17 RX ORDER — FLUTICASONE FUROATE AND VILANTEROL TRIFENATATE 100; 25 UG/1; UG/1
100-25 POWDER RESPIRATORY (INHALATION)
Qty: 1 | Refills: 2 | Status: ACTIVE | COMMUNITY
Start: 2024-10-17 | End: 1900-01-01

## 2024-10-17 RX ORDER — ALBUTEROL SULFATE 90 UG/1
108 (90 BASE) INHALANT RESPIRATORY (INHALATION)
Qty: 1 | Refills: 0 | Status: ACTIVE | COMMUNITY
Start: 2024-10-17 | End: 1900-01-01

## 2024-10-17 RX ORDER — AMLODIPINE BESYLATE 10 MG/1
10 TABLET ORAL
Refills: 0 | Status: ACTIVE | COMMUNITY
Start: 2024-10-17

## 2024-10-17 RX ORDER — OLMESARTAN MEDOXOMIL 20 MG/1
20 TABLET, FILM COATED ORAL
Refills: 0 | Status: ACTIVE | COMMUNITY
Start: 2024-10-17

## 2025-02-19 ENCOUNTER — NON-APPOINTMENT (OUTPATIENT)
Age: 65
End: 2025-02-19

## 2025-02-20 ENCOUNTER — APPOINTMENT (OUTPATIENT)
Dept: PULMONOLOGY | Facility: CLINIC | Age: 65
End: 2025-02-20
Payer: COMMERCIAL

## 2025-02-20 ENCOUNTER — NON-APPOINTMENT (OUTPATIENT)
Age: 65
End: 2025-02-20

## 2025-02-20 VITALS
DIASTOLIC BLOOD PRESSURE: 62 MMHG | TEMPERATURE: 97.1 F | HEART RATE: 51 BPM | OXYGEN SATURATION: 94 % | SYSTOLIC BLOOD PRESSURE: 109 MMHG | RESPIRATION RATE: 12 BRPM

## 2025-02-20 PROCEDURE — 99213 OFFICE O/P EST LOW 20 MIN: CPT

## 2025-02-20 RX ORDER — OMEPRAZOLE 20 MG/1
20 CAPSULE, DELAYED RELEASE ORAL DAILY
Qty: 30 | Refills: 1 | Status: ACTIVE | COMMUNITY
Start: 2025-02-20 | End: 1900-01-01

## 2025-03-22 ENCOUNTER — NON-APPOINTMENT (OUTPATIENT)
Age: 65
End: 2025-03-22

## 2025-05-05 ENCOUNTER — RX RENEWAL (OUTPATIENT)
Age: 65
End: 2025-05-05